# Patient Record
Sex: FEMALE | URBAN - METROPOLITAN AREA
[De-identification: names, ages, dates, MRNs, and addresses within clinical notes are randomized per-mention and may not be internally consistent; named-entity substitution may affect disease eponyms.]

---

## 2022-05-13 ENCOUNTER — APPOINTMENT (RX ONLY)
Dept: URBAN - METROPOLITAN AREA CLINIC 111 | Facility: CLINIC | Age: 52
Setting detail: DERMATOLOGY
End: 2022-05-13

## 2022-05-13 DIAGNOSIS — L23.9 ALLERGIC CONTACT DERMATITIS, UNSPECIFIED CAUSE: ICD-10-CM

## 2022-05-13 PROCEDURE — 99203 OFFICE O/P NEW LOW 30 MIN: CPT

## 2022-05-13 PROCEDURE — ? PRESCRIPTION

## 2022-05-13 PROCEDURE — ? DIAGNOSIS COMMENT

## 2022-05-13 PROCEDURE — ? COUNSELING

## 2022-05-13 PROCEDURE — ? PRESCRIPTION MEDICATION MANAGEMENT

## 2022-05-13 RX ORDER — HYDROCORTISONE 25 MG/G
OINTMENT TOPICAL
Qty: 28.35 | Refills: 1 | Status: ERX | COMMUNITY
Start: 2022-05-13

## 2022-05-13 RX ADMIN — HYDROCORTISONE: 25 OINTMENT TOPICAL at 00:00

## 2022-05-13 ASSESSMENT — LOCATION SIMPLE DESCRIPTION DERM
LOCATION SIMPLE: RIGHT CHEEK
LOCATION SIMPLE: RIGHT ANTERIOR NECK
LOCATION SIMPLE: LEFT CHEEK

## 2022-05-13 ASSESSMENT — LOCATION DETAILED DESCRIPTION DERM
LOCATION DETAILED: RIGHT INFERIOR ANTERIOR NECK
LOCATION DETAILED: RIGHT CENTRAL MALAR CHEEK
LOCATION DETAILED: LEFT CENTRAL MALAR CHEEK

## 2022-05-13 ASSESSMENT — LOCATION ZONE DERM
LOCATION ZONE: FACE
LOCATION ZONE: NECK

## 2022-05-13 NOTE — PROCEDURE: DIAGNOSIS COMMENT
Comment: Secondary to possibly hair dye, perfume or something patient is applying to herself. Luisa water for facial cleanser, [place] cream.
Detail Level: Simple
Render Risk Assessment In Note?: no

## 2022-05-13 NOTE — PROCEDURE: PRESCRIPTION MEDICATION MANAGEMENT
Detail Level: Zone
Render In Strict Bullet Format?: No
Initiate Treatment: -\\n\\nAdvised patient to hold on applying makeup for now, hold on radha water, no creams and no perfume.\\n\\nBegin a gentle cleanser, recommended CeraVe cleanser and CeraVe moisturizing cream.\\n\\nNeck-hydrocortisone 2.5 % topical ointment: Apply to affected areas on the face and neck twice a day for 2 weeks, 1 week off repeat as needed.\\n\\nEyelids- hydrocortisone 2.5 % topical ointment : Apply to affected areas on eyelids once a day 2 weeks\\n\\n-
Plan: -\\n\\nDiscussed with patient’s  that we recommend patch test. Patch Testing will let patient know what she CAN use and what causes her to breakout. Patient will come in on a Monday have patches applied to her back, patient can not get the area wet. Wednesday she will come in have her first reading. On Friday when patient come in the 2nd reading will then see any delayed reactions.\\n\\nWill send task to billing for insurance verification for patch , will then schedule testing.\\n-
Modify Regimen: -\\n\\nZyrtec daily\\n-

## 2022-05-13 NOTE — HPI: RASH
What Type Of Note Output Would You Prefer (Optional)?: Bullet Format
How Severe Is Your Rash?: moderate
Is This A New Presentation, Or A Follow-Up?: Rash
Additional History: Patient does not speak much English and has a person with her to translate. \\n\\nPatient reports that the rashes come and go, and the ones on her eyelids burn and cause her eyes to water. Patient notes joint pain sometimes but doesn’t know if it is related to rash. She states that she has taken Benadryl a few times for it, but notes little improvement.

## 2024-07-22 ENCOUNTER — APPOINTMENT (RX ONLY)
Dept: URBAN - METROPOLITAN AREA CLINIC 111 | Facility: CLINIC | Age: 54
Setting detail: DERMATOLOGY
End: 2024-07-22

## 2024-07-22 DIAGNOSIS — L20.89 OTHER ATOPIC DERMATITIS: ICD-10-CM | Status: INADEQUATELY CONTROLLED

## 2024-07-22 PROCEDURE — ? PRESCRIPTION MEDICATION MANAGEMENT

## 2024-07-22 PROCEDURE — 99214 OFFICE O/P EST MOD 30 MIN: CPT

## 2024-07-22 PROCEDURE — ? PRESCRIPTION

## 2024-07-22 PROCEDURE — ? COUNSELING

## 2024-07-22 PROCEDURE — ? DIAGNOSIS COMMENT

## 2024-07-22 RX ORDER — TRIAMCINOLONE ACETONIDE 1 MG/G
OINTMENT TOPICAL BID
Qty: 453.6 | Refills: 3 | Status: ERX | COMMUNITY
Start: 2024-07-22

## 2024-07-22 RX ORDER — PREDNISONE 20 MG/1
TABLET ORAL
Qty: 15 | Refills: 0 | Status: ERX | COMMUNITY
Start: 2024-07-22

## 2024-07-22 RX ADMIN — TRIAMCINOLONE ACETONIDE: 1 OINTMENT TOPICAL at 00:00

## 2024-07-22 RX ADMIN — PREDNISONE: 20 TABLET ORAL at 00:00

## 2024-07-22 ASSESSMENT — LOCATION DETAILED DESCRIPTION DERM
LOCATION DETAILED: PERIUMBILICAL SKIN
LOCATION DETAILED: RIGHT VENTRAL PROXIMAL FOREARM
LOCATION DETAILED: LEFT VENTRAL PROXIMAL FOREARM

## 2024-07-22 ASSESSMENT — BSA ECZEMA: % BODY COVERED IN ECZEMA: 14

## 2024-07-22 ASSESSMENT — ITCH NUMERIC RATING SCALE: HOW SEVERE IS YOUR ITCHING?: 7

## 2024-07-22 ASSESSMENT — SEVERITY ASSESSMENT 2020: SEVERITY 2020: MODERATE

## 2024-07-22 ASSESSMENT — LOCATION ZONE DERM
LOCATION ZONE: ARM
LOCATION ZONE: TRUNK

## 2024-07-22 ASSESSMENT — LOCATION SIMPLE DESCRIPTION DERM
LOCATION SIMPLE: LEFT FOREARM
LOCATION SIMPLE: ABDOMEN
LOCATION SIMPLE: RIGHT FOREARM

## 2024-07-22 NOTE — PROCEDURE: PRESCRIPTION MEDICATION MANAGEMENT
Initiate Treatment: -\\n- OTC CeraVe Gentle cleanser: Use once a day as body wash in the shower. \\n- OTC CeraVe moisturizing cream: Use twice a day on affected areas (dry areas) within two minutes of getting out of the shower. \\n- prednisone 20mg: Take 2 tablets by mouth for 5 days, then 1 tablet by mouth for 5 days.\\n- triamcinolone 0.1% ointment: Apply to affected areas 2 times a day, 2 weeks on, 1 week off.
Plan: FU in 2 months.
Detail Level: Zone
Render In Strict Bullet Format?: No

## 2024-07-22 NOTE — HPI: RASH
What Type Of Note Output Would You Prefer (Optional)?: Bullet Format
How Severe Is Your Rash?: moderate
Is This A New Presentation, Or A Follow-Up?: Rash
Additional History: Pt reports rash on arms that is starting to spread to body throughout. She has used Cera Ve healing ointment with no improvement.

## 2024-07-22 NOTE — PROCEDURE: DIAGNOSIS COMMENT
Comment: Patient expressed that she previously had a history of eczema, explained to patient and patients partner that this is a severe eczema flare. Patient expressed understanding. Dupixent and patch testing in reserve is no improvement is seen. FU in 2 months.
Detail Level: Simple
Render Risk Assessment In Note?: no